# Patient Record
Sex: MALE
[De-identification: names, ages, dates, MRNs, and addresses within clinical notes are randomized per-mention and may not be internally consistent; named-entity substitution may affect disease eponyms.]

---

## 2020-06-10 PROBLEM — Z00.00 ENCOUNTER FOR PREVENTIVE HEALTH EXAMINATION: Status: ACTIVE | Noted: 2020-06-10

## 2020-06-12 ENCOUNTER — APPOINTMENT (OUTPATIENT)
Dept: UROLOGY | Facility: CLINIC | Age: 50
End: 2020-06-12
Payer: COMMERCIAL

## 2020-06-12 DIAGNOSIS — N13.8 BENIGN PROSTATIC HYPERPLASIA WITH LOWER URINARY TRACT SYMPMS: ICD-10-CM

## 2020-06-12 DIAGNOSIS — N40.1 BENIGN PROSTATIC HYPERPLASIA WITH LOWER URINARY TRACT SYMPMS: ICD-10-CM

## 2020-06-12 DIAGNOSIS — R97.20 ELEVATED PROSTATE, SPECIFIC ANTIGEN [PSA]: ICD-10-CM

## 2020-06-12 PROCEDURE — 99204 OFFICE O/P NEW MOD 45 MIN: CPT | Mod: 95

## 2020-06-12 NOTE — ASSESSMENT
[FreeTextEntry1] : We discussed changing his alpha blocker medicine from terazosin to tamsulosin at 0.4 mg daily and I ordered this for him. We discussed the pros and cons of this switch and the chance of retrograde ejaculation. I also recommended that he have a 4K score drawn now and call me for the result. If it is normal, then we will plan to meet and reassess in 2 months. We ended the video portion of the visit at  10:55 AM. Haylee Gusman MD\par \par Pathology report from 11/28/2016 reviewed, [facility]. Haylee Gusman MD\par \par

## 2020-06-12 NOTE — LETTER BODY
[Please see my note below.] : Please see my note below. [Dear  ___] : Dear  [unfilled], [Consult Closing:] : Thank you very much for allowing me to participate in the care of this patient.  If you have any questions, please do not hesitate to contact me. [FreeTextEntry2] : Bri Gardner [FreeTextEntry1] : I had the pleasure of evaluation of your patient today via a telehealth virtual visit.\par  [FreeTextEntry3] : Best Regards, \par \par Haylee Gusman MD\par

## 2020-06-12 NOTE — HISTORY OF PRESENT ILLNESS
[Home] : at home, [unfilled] , at the time of the visit. [Other Location: e.g. Home (Enter Location, City,State)___] : at [unfilled] [Verbal consent obtained from patient] : the patient, [unfilled] [FreeTextEntry1] :  The patient-doctor relationship has been established in a face to face fashion via real time video/audio HIPAA compliant communication using telemedicine software. The patient's identity has been confirmed. The patient was previously emailed a copy of the telemedicine consent. They have had a chance to review and has now given verbal consent and has requested care to be assessed and treated through telemedicine and understands there maybe limitations in this process and they may need further follow up care in the office and or hospital settings. \par Verbal consent given on 6/12/2020, at 10;30, by Napoleon Ortiz.\par \par This 49 year old male former patient of [facility] comes to see me today to transition hi care to me for an elevated PSA and LUTS. He is on terazosin 1 mg for the LUTS which helps somewhat, but he cannot tolerate a higher dose due to fatigue. He has frequency, decreased stream, double voiding and nocturia x 0-1. No dysuria or hematuria.\par \par He was last seen by [facility] on 9/10/2019 and the PSA at that time was 4.37 (10%). A repeat 2 weeks later was improved at 4.17 (10%). This patient had a PNB 18 months ago that he states was negative. Path report NA, staff will obtain it. He was unable to tolerate a standard MRI and the picture from the open MRI was not adequate, therefore the biopsy. Patient also tells me that he has no FH of CaP, had a vasectomy, has asthma and tinnitus, NKDA.IPSS score today 20/35 indicating moderate to severe symptoms, GRACIA score 23/25, Rei score 2/10, both normal.\par \par  The patient denies fevers, chills, nausea and or vomiting and no unexplained weight loss. \par All pertinent parts of the patient PFSH (past medical, family and social histories), laboratory, radiological studies and physician notes were reviewed prior to starting the face to face portion of the telemedicine visit. Questionnaire results were discussed with patient.\par

## 2020-07-17 RX ORDER — TAMSULOSIN HYDROCHLORIDE 0.4 MG/1
0.4 CAPSULE ORAL
Qty: 90 | Refills: 3 | Status: ACTIVE | COMMUNITY
Start: 2020-07-17 | End: 1900-01-01